# Patient Record
Sex: FEMALE | Race: WHITE | NOT HISPANIC OR LATINO | Employment: UNEMPLOYED | URBAN - METROPOLITAN AREA
[De-identification: names, ages, dates, MRNs, and addresses within clinical notes are randomized per-mention and may not be internally consistent; named-entity substitution may affect disease eponyms.]

---
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
## 2023-08-25 ENCOUNTER — OFFICE VISIT (OUTPATIENT)
Dept: FAMILY MEDICINE CLINIC | Facility: CLINIC | Age: 18
End: 2023-08-25
Payer: COMMERCIAL

## 2023-08-25 VITALS
RESPIRATION RATE: 16 BRPM | OXYGEN SATURATION: 99 % | SYSTOLIC BLOOD PRESSURE: 124 MMHG | HEART RATE: 81 BPM | HEIGHT: 64 IN | WEIGHT: 222.8 LBS | BODY MASS INDEX: 38.04 KG/M2 | DIASTOLIC BLOOD PRESSURE: 76 MMHG | TEMPERATURE: 97.7 F

## 2023-08-25 DIAGNOSIS — R79.89 ELEVATED CORTISOL LEVEL: ICD-10-CM

## 2023-08-25 DIAGNOSIS — R73.9 ELEVATED BLOOD SUGAR: ICD-10-CM

## 2023-08-25 DIAGNOSIS — Z00.00 ANNUAL PHYSICAL EXAM: Primary | ICD-10-CM

## 2023-08-25 DIAGNOSIS — E66.01 CLASS 2 SEVERE OBESITY DUE TO EXCESS CALORIES WITH SERIOUS COMORBIDITY AND BODY MASS INDEX (BMI) OF 38.0 TO 38.9 IN ADULT: ICD-10-CM

## 2023-08-25 DIAGNOSIS — L40.9 PSORIASIS: ICD-10-CM

## 2023-08-25 DIAGNOSIS — Z13.220 SCREENING FOR LIPID DISORDERS: ICD-10-CM

## 2023-08-25 DIAGNOSIS — Z11.59 NEED FOR HEPATITIS C SCREENING TEST: ICD-10-CM

## 2023-08-25 PROBLEM — E66.812 CLASS 2 SEVERE OBESITY DUE TO EXCESS CALORIES WITH SERIOUS COMORBIDITY AND BODY MASS INDEX (BMI) OF 38.0 TO 38.9 IN ADULT: Chronic | Status: ACTIVE | Noted: 2023-08-25

## 2023-08-25 PROBLEM — E66.812 CLASS 2 SEVERE OBESITY DUE TO EXCESS CALORIES WITH SERIOUS COMORBIDITY AND BODY MASS INDEX (BMI) OF 38.0 TO 38.9 IN ADULT: Status: ACTIVE | Noted: 2023-08-25

## 2023-08-25 RX ORDER — CETIRIZINE HYDROCHLORIDE 10 MG/1
10 TABLET ORAL DAILY PRN
COMMUNITY

## 2023-08-25 RX ORDER — DEXAMETHASONE 1 MG
1 TABLET ORAL ONCE
Qty: 1 TABLET | Refills: 0 | Status: SHIPPED | OUTPATIENT
Start: 2023-08-25 | End: 2023-08-25

## 2023-08-25 RX ORDER — TRIAMCINOLONE ACETONIDE 1 MG/G
1 CREAM TOPICAL 2 TIMES DAILY
Qty: 80 G | Refills: 0 | Status: SHIPPED | OUTPATIENT
Start: 2023-08-25

## 2023-08-25 NOTE — ASSESSMENT & PLAN NOTE
Patient's (Body mass index is 38.24 kg/mý.) indicates that they are morbidly/severely obese (BMI > 40 or > 35 with obesity - related health condition) with health conditions that include none . Weight is newly identified. BMI  is above average; BMI management plan is completed. We discussed low calorie, low carb based diet program, portion control, and increasing exercise.

## 2023-08-25 NOTE — PROGRESS NOTES
"Chief Complaint  Establish Care    Subjective        Arianna Leal presents to [facility]  History of Present Illness  She is here today to establish relations as a new patient. She is single and does not have a boyfriend, cat or dog. She lives at home. Her mother has a few health issues she is not aware. She denies tobacco, drug or EtOH use.     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    She is having a rash on her neck and shoulder.     The patient has no other complaints today and denies chest pain, shortness of breath, weakness, numbness, nausea, vomiting, diarrhea, dizziness or syncopal event.              Objective   Vital Signs:  /76   Pulse 81   Temp 97.7 øF (36.5 øC)   Resp 16   Ht 162.6 cm (64\")   Wt 101 kg (222 lb 12.8 oz)   SpO2 99%   BMI 38.24 kg/mý   Estimated body mass index is 38.24 kg/mý as calculated from the following:    Height as of this encounter: 162.6 cm (64\").    Weight as of this encounter: 101 kg (222 lb 12.8 oz).  Facility age limit for growth percentiles is 20 years.          Physical Exam  Vitals reviewed.   Constitutional:       Appearance: She is well-developed. She is morbidly obese.   HENT:      Head: Normocephalic and atraumatic.      Right Ear: External ear normal.      Left Ear: External ear normal.      Mouth/Throat:      Pharynx: No oropharyngeal exudate.   Eyes:      Conjunctiva/sclera: Conjunctivae normal.      Pupils: Pupils are equal, round, and reactive to light.   Neck:      Vascular: No carotid bruit.   Cardiovascular:      Rate and Rhythm: Normal rate and regular rhythm.      Heart sounds: No murmur heard.    No friction rub. No gallop.   Pulmonary:      Effort: Pulmonary effort is normal.      Breath sounds: Normal breath sounds. No wheezing or rhonchi.   Abdominal:      General: There is no distension.   Skin:     General: Skin is warm and dry.      Comments: Course raised erythematous area 10-4 cm on " left and posterior neck    Neurological:      Mental Status: She is alert and oriented to person, place, and time.      Cranial Nerves: No cranial nerve deficit.      Motor: No weakness.   Psychiatric:         Mood and Affect: Mood and affect normal.         Behavior: Behavior normal.         Thought Content: Thought content normal.         Judgment: Judgment normal.      Result Review :                         Assessment and Plan   Diagnoses and all orders for this visit:    1. Annual physical exam (Primary)  Assessment & Plan:  The patient was encouraged to always wear their seatbelt and never text and drive. She was encouraged to get 7 to 8 hours sleep at night. She was encouraged to exercise on a regular basis. She was encouraged to avoid drugs, EtOH and tobacco. Screening labs were reviewed at today's visit and manage according to findings.      Orders:  -     CBC & Differential; Future  -     Comprehensive metabolic panel; Future  -     TSH+Free T4; Future    2. Elevated cortisol level  -     DHEA  -     Cortisol - AM; Future  -     ACTH; Future  -     DHEA-Sulfate; Future  -     Testosterone (Free & Total), LC / MS; Future  -     Androstenedione; Future  -     17-Hydroxypregnenolone; Future          -     dexAMETHasone (DECADRON) 1 MG tablet; Take 1 tablet by mouth 1 (One) Time for 1 dose.  Dispense: 1 tablet; Refill: 0      3. Psoriasis  Assessment & Plan:  She was encouraged to try the steroid cream for three weeks and follow-up.     Orders:  -     triamcinolone (KENALOG) 0.1 % cream; Apply 1 application  topically to the appropriate area as directed 2 (Two) Times a Day.  Dispense: 80 g; Refill: 0    4. Need for hepatitis C screening test  -     Hepatitis C antibody; Future    5. Screening for lipid disorders  -     Lipid panel; Future    6. Elevated blood sugar  Comments:  She was given an order for an A1C to be managed according to findings.  Orders:  -     Hemoglobin A1c; Future    7. Class 2 severe  obesity due to excess calories with serious comorbidity and body mass index (BMI) of 38.0 to 38.9 in adult  Assessment & Plan:  Patient's (Body mass index is 38.24 kg/mý.) indicates that they are morbidly/severely obese (BMI > 40 or > 35 with obesity - related health condition) with health conditions that include none . Weight is newly identified. BMI  is above average; BMI management plan is completed. We discussed low calorie, low carb based diet program, portion control, and increasing exercise.          Follow Up   Return in about 2 weeks (around 9/8/2023).  Patient was given instructions and counseling regarding her condition or for health maintenance advice. Please see specific information pulled into the AVS if appropriate.

## 2023-08-25 NOTE — ASSESSMENT & PLAN NOTE
The patient was encouraged to always wear their seatbelt and never text and drive. She was encouraged to get 7 to 8 hours sleep at night. She was encouraged to exercise on a regular basis. She was encouraged to avoid drugs, EtOH and tobacco. Screening labs were reviewed at today's visit and manage according to findings.

## 2023-09-01 ENCOUNTER — TELEPHONE (OUTPATIENT)
Dept: FAMILY MEDICINE CLINIC | Facility: CLINIC | Age: 18
End: 2023-09-01
Payer: COMMERCIAL

## 2023-09-07 ENCOUNTER — LAB (OUTPATIENT)
Dept: LAB | Facility: HOSPITAL | Age: 18
End: 2023-09-07
Payer: COMMERCIAL

## 2023-09-07 DIAGNOSIS — R79.89 ELEVATED CORTISOL LEVEL: ICD-10-CM

## 2023-09-07 DIAGNOSIS — R73.9 ELEVATED BLOOD SUGAR: ICD-10-CM

## 2023-09-07 DIAGNOSIS — Z00.00 ANNUAL PHYSICAL EXAM: ICD-10-CM

## 2023-09-07 DIAGNOSIS — Z13.220 SCREENING FOR LIPID DISORDERS: ICD-10-CM

## 2023-09-07 DIAGNOSIS — Z11.59 NEED FOR HEPATITIS C SCREENING TEST: ICD-10-CM

## 2023-09-07 LAB
ALBUMIN SERPL-MCNC: 4.3 G/DL (ref 3.5–5.2)
ALBUMIN/GLOB SERPL: 1.7 G/DL
ALP SERPL-CCNC: 77 U/L (ref 39–117)
ALT SERPL W P-5'-P-CCNC: 21 U/L (ref 1–33)
ANION GAP SERPL CALCULATED.3IONS-SCNC: 9.2 MMOL/L (ref 5–15)
AST SERPL-CCNC: 12 U/L (ref 1–32)
BASOPHILS # BLD AUTO: 0.02 10*3/MM3 (ref 0–0.2)
BASOPHILS NFR BLD AUTO: 0.2 % (ref 0–1.5)
BILIRUB SERPL-MCNC: 0.2 MG/DL (ref 0–1.2)
BUN SERPL-MCNC: 9 MG/DL (ref 6–20)
BUN/CREAT SERPL: 12.9 (ref 7–25)
CALCIUM SPEC-SCNC: 9.6 MG/DL (ref 8.6–10.5)
CHLORIDE SERPL-SCNC: 107 MMOL/L (ref 98–107)
CHOLEST SERPL-MCNC: 177 MG/DL (ref 0–200)
CO2 SERPL-SCNC: 22.8 MMOL/L (ref 22–29)
CORTIS AM PEAK SERPL-MCNC: 16.7 MCG/DL (ref 6.02–18.4)
CREAT SERPL-MCNC: 0.7 MG/DL (ref 0.57–1)
DEPRECATED RDW RBC AUTO: 38.4 FL (ref 37–54)
EGFRCR SERPLBLD CKD-EPI 2021: 127.2 ML/MIN/1.73
EOSINOPHIL # BLD AUTO: 0.09 10*3/MM3 (ref 0–0.4)
EOSINOPHIL NFR BLD AUTO: 1 % (ref 0.3–6.2)
ERYTHROCYTE [DISTWIDTH] IN BLOOD BY AUTOMATED COUNT: 13.1 % (ref 12.3–15.4)
GLOBULIN UR ELPH-MCNC: 2.6 GM/DL
GLUCOSE SERPL-MCNC: 94 MG/DL (ref 65–99)
HBA1C MFR BLD: 5.1 % (ref 4.8–5.6)
HCT VFR BLD AUTO: 40.1 % (ref 34–46.6)
HCV AB SER DONR QL: NORMAL
HDLC SERPL-MCNC: 53 MG/DL (ref 40–60)
HGB BLD-MCNC: 13.1 G/DL (ref 12–15.9)
IMM GRANULOCYTES # BLD AUTO: 0.03 10*3/MM3 (ref 0–0.05)
IMM GRANULOCYTES NFR BLD AUTO: 0.3 % (ref 0–0.5)
LDLC SERPL CALC-MCNC: 109 MG/DL (ref 0–100)
LDLC/HDLC SERPL: 2.05 {RATIO}
LYMPHOCYTES # BLD AUTO: 1.9 10*3/MM3 (ref 0.7–3.1)
LYMPHOCYTES NFR BLD AUTO: 22 % (ref 19.6–45.3)
MCH RBC QN AUTO: 26.6 PG (ref 26.6–33)
MCHC RBC AUTO-ENTMCNC: 32.7 G/DL (ref 31.5–35.7)
MCV RBC AUTO: 81.3 FL (ref 79–97)
MONOCYTES # BLD AUTO: 0.84 10*3/MM3 (ref 0.1–0.9)
MONOCYTES NFR BLD AUTO: 9.7 % (ref 5–12)
NEUTROPHILS NFR BLD AUTO: 5.75 10*3/MM3 (ref 1.7–7)
NEUTROPHILS NFR BLD AUTO: 66.8 % (ref 42.7–76)
NRBC BLD AUTO-RTO: 0 /100 WBC (ref 0–0.2)
PLATELET # BLD AUTO: 344 10*3/MM3 (ref 140–450)
PMV BLD AUTO: 9.7 FL (ref 6–12)
POTASSIUM SERPL-SCNC: 4.2 MMOL/L (ref 3.5–5.2)
PROT SERPL-MCNC: 6.9 G/DL (ref 6–8.5)
RBC # BLD AUTO: 4.93 10*6/MM3 (ref 3.77–5.28)
SODIUM SERPL-SCNC: 139 MMOL/L (ref 136–145)
T4 FREE SERPL-MCNC: 0.94 NG/DL (ref 0.93–1.7)
TRIGL SERPL-MCNC: 78 MG/DL (ref 0–150)
TSH SERPL DL<=0.05 MIU/L-ACNC: 2.75 UIU/ML (ref 0.27–4.2)
VLDLC SERPL-MCNC: 15 MG/DL (ref 5–40)
WBC NRBC COR # BLD: 8.63 10*3/MM3 (ref 3.4–10.8)

## 2023-09-07 PROCEDURE — 82533 TOTAL CORTISOL: CPT

## 2023-09-07 PROCEDURE — 82626 DEHYDROEPIANDROSTERONE: CPT | Performed by: FAMILY MEDICINE

## 2023-09-07 PROCEDURE — 84439 ASSAY OF FREE THYROXINE: CPT

## 2023-09-07 PROCEDURE — 84143 ASSAY OF 17-HYDROXYPREGNENO: CPT

## 2023-09-07 PROCEDURE — 84403 ASSAY OF TOTAL TESTOSTERONE: CPT

## 2023-09-07 PROCEDURE — 80053 COMPREHEN METABOLIC PANEL: CPT

## 2023-09-07 PROCEDURE — 82024 ASSAY OF ACTH: CPT

## 2023-09-07 PROCEDURE — 84402 ASSAY OF FREE TESTOSTERONE: CPT

## 2023-09-07 PROCEDURE — 36415 COLL VENOUS BLD VENIPUNCTURE: CPT

## 2023-09-07 PROCEDURE — 82627 DEHYDROEPIANDROSTERONE: CPT

## 2023-09-07 PROCEDURE — 85025 COMPLETE CBC W/AUTO DIFF WBC: CPT

## 2023-09-07 PROCEDURE — 84443 ASSAY THYROID STIM HORMONE: CPT

## 2023-09-07 PROCEDURE — 86803 HEPATITIS C AB TEST: CPT

## 2023-09-07 PROCEDURE — 80061 LIPID PANEL: CPT

## 2023-09-07 PROCEDURE — 83036 HEMOGLOBIN GLYCOSYLATED A1C: CPT

## 2023-09-07 PROCEDURE — 82157 ASSAY OF ANDROSTENEDIONE: CPT

## 2023-09-08 ENCOUNTER — OFFICE VISIT (OUTPATIENT)
Dept: FAMILY MEDICINE CLINIC | Facility: CLINIC | Age: 18
End: 2023-09-08
Payer: COMMERCIAL

## 2023-09-08 VITALS
HEART RATE: 65 BPM | WEIGHT: 223.8 LBS | HEIGHT: 64 IN | RESPIRATION RATE: 18 BRPM | OXYGEN SATURATION: 100 % | TEMPERATURE: 98.2 F | DIASTOLIC BLOOD PRESSURE: 64 MMHG | BODY MASS INDEX: 38.21 KG/M2 | SYSTOLIC BLOOD PRESSURE: 125 MMHG

## 2023-09-08 DIAGNOSIS — R68.89 ABNORMAL ENDOCRINE LABORATORY TEST FINDING: ICD-10-CM

## 2023-09-08 DIAGNOSIS — L40.9 PSORIASIS: Primary | Chronic | ICD-10-CM

## 2023-09-08 DIAGNOSIS — E66.01 CLASS 2 SEVERE OBESITY DUE TO EXCESS CALORIES WITH SERIOUS COMORBIDITY AND BODY MASS INDEX (BMI) OF 38.0 TO 38.9 IN ADULT: Chronic | ICD-10-CM

## 2023-09-08 LAB
ACTH PLAS-MCNC: 23.4 PG/ML (ref 7.2–63.3)
DHEA-S SERPL-MCNC: 36.7 UG/DL (ref 110–431.7)

## 2023-09-08 NOTE — PROGRESS NOTES
"Chief Complaint  Psoriasis (2 week follow up after steroid cream)    Subjective        Arianna Leal presents to [facility]  History of Present Illness  She is here today for management of her chronic medical conditions. She is single and does not have a boyfriend, cat or dog. She lives at home. Her mother has a few health issues she is not aware. She denies tobacco, drug or EtOH use.      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 She is going to graduate in Sept of this year.      She had a rash on her neck and shoulders that had been present for months. She was given triamcinolone cream at her last visit and it resolved.      The patient has no other complaints today and denies chest pain, shortness of breath, weakness, numbness, nausea, vomiting, diarrhea, dizziness or syncopal event.      Objective   Vital Signs:  /64 (BP Location: Left arm, Patient Position: Sitting, Cuff Size: Adult)   Pulse 65   Temp 98.2 °F (36.8 °C) (Tympanic)   Resp 18   Ht 162.6 cm (64.02\")   Wt 102 kg (223 lb 12.8 oz)   SpO2 100%   BMI 38.40 kg/m²   Estimated body mass index is 38.4 kg/m² as calculated from the following:    Height as of this encounter: 162.6 cm (64.02\").    Weight as of this encounter: 102 kg (223 lb 12.8 oz).  Facility age limit for growth percentiles is 20 years.            Physical Exam  Vitals reviewed.   Constitutional:       Appearance: She is well-developed. She is morbidly obese.   HENT:      Head: Normocephalic and atraumatic.      Right Ear: External ear normal.      Left Ear: External ear normal.      Mouth/Throat:      Pharynx: No oropharyngeal exudate.   Eyes:      Conjunctiva/sclera: Conjunctivae normal.      Pupils: Pupils are equal, round, and reactive to light.   Neck:      Vascular: No carotid bruit.   Cardiovascular:      Rate and Rhythm: Normal rate and regular rhythm.      Heart sounds: No murmur heard.    No friction rub. No gallop. "   Pulmonary:      Effort: Pulmonary effort is normal.      Breath sounds: Normal breath sounds. No wheezing or rhonchi.   Abdominal:      General: There is no distension.   Skin:     General: Skin is warm and dry.   Neurological:      Mental Status: She is alert and oriented to person, place, and time.      Cranial Nerves: No cranial nerve deficit.      Motor: No weakness.   Psychiatric:         Mood and Affect: Mood and affect normal.         Behavior: Behavior normal.         Thought Content: Thought content normal.         Judgment: Judgment normal.      Result Review :    CMP          9/7/2023    08:15   CMP   Glucose 94    BUN 9    Creatinine 0.70    EGFR 127.2    Sodium 139    Potassium 4.2    Chloride 107    Calcium 9.6    Total Protein 6.9    Albumin 4.3    Globulin 2.6    Total Bilirubin 0.2    Alkaline Phosphatase 77    AST (SGOT) 12    ALT (SGPT) 21    Albumin/Globulin Ratio 1.7    BUN/Creatinine Ratio 12.9    Anion Gap 9.2      CBC          9/7/2023    08:15   CBC   WBC 8.63    RBC 4.93    Hemoglobin 13.1    Hematocrit 40.1    MCV 81.3    MCH 26.6    MCHC 32.7    RDW 13.1    Platelets 344      Lipid Panel          9/7/2023    08:15   Lipid Panel   Total Cholesterol 177    Triglycerides 78    HDL Cholesterol 53    VLDL Cholesterol 15    LDL Cholesterol  109    LDL/HDL Ratio 2.05      TSH          9/7/2023    08:15   TSH   TSH 2.750                   Assessment and Plan   Diagnoses and all orders for this visit:    1. Psoriasis (Primary)  Assessment & Plan:  Improving with treatment, will continue triamcinolone cream as needed.       2. Class 2 severe obesity due to excess calories with serious comorbidity and body mass index (BMI) of 38.0 to 38.9 in adult  Assessment & Plan:  Patient's (Body mass index is 38.4 kg/m².) indicates that they are morbidly/severely obese (BMI > 40 or > 35 with obesity - related health condition) with health conditions that include none . Weight is worsening. BMI  is above  average; BMI management plan is completed. We discussed low calorie, low carb based diet program, portion control, and increasing exercise.       3. Abnormal endocrine laboratory test finding  Assessment & Plan:  DHEA came back low. Dexamethasone suppression test negative. Still waiting for ACTH and other levels.                Follow Up   Return in about 6 months (around 3/8/2024).  Patient was given instructions and counseling regarding her condition or for health maintenance advice. Please see specific information pulled into the AVS if appropriate.

## 2023-09-09 PROBLEM — R68.89 ABNORMAL ENDOCRINE LABORATORY TEST FINDING: Status: ACTIVE | Noted: 2023-09-09

## 2023-09-09 PROBLEM — R68.89 ABNORMAL ENDOCRINE LABORATORY TEST FINDING: Chronic | Status: ACTIVE | Noted: 2023-09-09

## 2023-09-09 PROBLEM — Z00.00 ANNUAL PHYSICAL EXAM: Status: RESOLVED | Noted: 2023-08-25 | Resolved: 2023-09-09

## 2023-09-09 NOTE — ASSESSMENT & PLAN NOTE
DHEA came back low. Dexamethasone suppression test negative. Still waiting for ACTH and other levels.

## 2023-09-09 NOTE — ASSESSMENT & PLAN NOTE
Patient's (Body mass index is 38.4 kg/m².) indicates that they are morbidly/severely obese (BMI > 40 or > 35 with obesity - related health condition) with health conditions that include none . Weight is worsening. BMI  is above average; BMI management plan is completed. We discussed low calorie, low carb based diet program, portion control, and increasing exercise.

## 2023-09-11 LAB — ANDROST SERPL-MCNC: 201 NG/DL (ref 41–262)

## 2023-09-13 LAB — DHEA SERPL-MCNC: 154 NG/DL (ref 31–701)

## 2023-09-15 LAB — 17OH-PREG SERPL-MCNC: 104 NG/DL

## 2023-09-16 LAB
TESTOST FREE SERPL-MCNC: 1 PG/ML (ref 0–4.2)
TESTOST SERPL-MCNC: 33.7 NG/DL (ref 10–55)

## 2023-11-07 ENCOUNTER — OFFICE VISIT (OUTPATIENT)
Dept: FAMILY MEDICINE CLINIC | Facility: CLINIC | Age: 18
End: 2023-11-07
Payer: COMMERCIAL

## 2023-11-07 VITALS
DIASTOLIC BLOOD PRESSURE: 84 MMHG | TEMPERATURE: 98.2 F | HEART RATE: 76 BPM | WEIGHT: 228.2 LBS | RESPIRATION RATE: 18 BRPM | SYSTOLIC BLOOD PRESSURE: 139 MMHG | OXYGEN SATURATION: 100 % | BODY MASS INDEX: 38.96 KG/M2 | HEIGHT: 64 IN

## 2023-11-07 DIAGNOSIS — Z71.3 ENCOUNTER FOR WEIGHT LOSS COUNSELING: ICD-10-CM

## 2023-11-07 DIAGNOSIS — F50.81 BINGE EATING DISORDER: Primary | ICD-10-CM

## 2023-11-07 DIAGNOSIS — Z51.81 MEDICATION MONITORING ENCOUNTER: ICD-10-CM

## 2023-11-07 DIAGNOSIS — E66.09 CLASS 2 OBESITY DUE TO EXCESS CALORIES WITHOUT SERIOUS COMORBIDITY WITH BODY MASS INDEX (BMI) OF 39.0 TO 39.9 IN ADULT: ICD-10-CM

## 2023-11-07 DIAGNOSIS — E03.9 ACQUIRED HYPOTHYROIDISM: ICD-10-CM

## 2023-11-07 PROBLEM — E66.812 CLASS 2 SEVERE OBESITY DUE TO EXCESS CALORIES WITH SERIOUS COMORBIDITY AND BODY MASS INDEX (BMI) OF 38.0 TO 38.9 IN ADULT: Chronic | Status: RESOLVED | Noted: 2023-08-25 | Resolved: 2023-11-07

## 2023-11-07 PROBLEM — F50.819 BINGE EATING DISORDER: Status: ACTIVE | Noted: 2023-11-07

## 2023-11-07 PROBLEM — E66.01 CLASS 2 SEVERE OBESITY DUE TO EXCESS CALORIES WITH SERIOUS COMORBIDITY AND BODY MASS INDEX (BMI) OF 38.0 TO 38.9 IN ADULT: Chronic | Status: RESOLVED | Noted: 2023-08-25 | Resolved: 2023-11-07

## 2023-11-07 PROBLEM — E66.812 CLASS 2 OBESITY DUE TO EXCESS CALORIES WITHOUT SERIOUS COMORBIDITY WITH BODY MASS INDEX (BMI) OF 39.0 TO 39.9 IN ADULT: Status: ACTIVE | Noted: 2023-11-07

## 2023-11-07 LAB
AMPHET+METHAMPHET UR QL: NEGATIVE
AMPHETAMINE INTERNAL CONTROL: NORMAL
AMPHETAMINES UR QL: NEGATIVE
BARBITURATE INTERNAL CONTROL: NORMAL
BARBITURATES UR QL SCN: NEGATIVE
BENZODIAZ UR QL SCN: NEGATIVE
BENZODIAZEPINE INTERNAL CONTROL: NORMAL
BUPRENORPHINE INTERNAL CONTROL: NORMAL
BUPRENORPHINE SERPL-MCNC: NEGATIVE NG/ML
CANNABINOIDS SERPL QL: NEGATIVE
COCAINE INTERNAL CONTROL: NORMAL
COCAINE UR QL: NEGATIVE
EXPIRATION DATE: NORMAL
Lab: NORMAL
MDMA (ECSTASY) INTERNAL CONTROL: NORMAL
MDMA UR QL SCN: NEGATIVE
METHADONE INTERNAL CONTROL: NORMAL
METHADONE UR QL SCN: NEGATIVE
METHAMPHETAMINE INTERNAL CONTROL: NORMAL
MORPHINE INTERNAL CONTROL: NORMAL
MORPHINE UR QL SCN: NEGATIVE
OXYCODONE INTERNAL CONTROL: NORMAL
OXYCODONE UR QL SCN: NEGATIVE
PCP UR QL SCN: NEGATIVE
PHENCYCLIDINE INTERNAL CONTROL: NORMAL
THC INTERNAL CONTROL: NORMAL

## 2023-11-07 RX ORDER — LISDEXAMFETAMINE DIMESYLATE CAPSULES 30 MG/1
30 CAPSULE ORAL EVERY MORNING
Qty: 30 CAPSULE | Refills: 0 | Status: SHIPPED | OUTPATIENT
Start: 2023-11-07 | End: 2023-11-08 | Stop reason: SDUPTHER

## 2023-11-07 RX ORDER — SEMAGLUTIDE 0.25 MG/.5ML
0.5 INJECTION, SOLUTION SUBCUTANEOUS
Qty: 2 ML | Refills: 0 | Status: SHIPPED | OUTPATIENT
Start: 2023-11-07 | End: 2023-11-08 | Stop reason: SDUPTHER

## 2023-11-07 RX ORDER — LEVOTHYROXINE SODIUM 0.03 MG/1
25 TABLET ORAL
Qty: 30 TABLET | Refills: 5 | Status: SHIPPED | OUTPATIENT
Start: 2023-11-07

## 2023-11-07 NOTE — PROGRESS NOTES
"Chief Complaint  Weight Loss (Discuss /)    Subjective        Arianna Leal presents to [facility]  History of Present Illness  She is here today for management of her chronic medical conditions and for weight loss. She is single and does not have a boyfriend, cat or dog. She lives at home. Her mother has a few health issues she is not aware. She denies tobacco, drug or EtOH use.      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      The patient has no other complaints today and denies chest pain, shortness of breath, weakness, numbness, nausea, vomiting, diarrhea, dizziness or syncopal event.        Objective   Vital Signs:  /84 (BP Location: Left arm, Patient Position: Sitting, Cuff Size: Adult)   Pulse 76   Temp 98.2 °F (36.8 °C) (Tympanic)   Resp 18   Ht 162.6 cm (64.02\")   Wt 104 kg (228 lb 3.2 oz)   SpO2 100%   BMI 39.15 kg/m²   Estimated body mass index is 39.15 kg/m² as calculated from the following:    Height as of this encounter: 162.6 cm (64.02\").    Weight as of this encounter: 104 kg (228 lb 3.2 oz).  Facility age limit for growth %ronald is 20 years.            Physical Exam  Vitals reviewed.   Constitutional:       Appearance: She is well-developed. She is morbidly obese.   HENT:      Head: Normocephalic and atraumatic.      Right Ear: External ear normal.      Left Ear: External ear normal.      Mouth/Throat:      Pharynx: No oropharyngeal exudate.   Eyes:      Conjunctiva/sclera: Conjunctivae normal.      Pupils: Pupils are equal, round, and reactive to light.   Neck:      Vascular: No carotid bruit.   Cardiovascular:      Rate and Rhythm: Normal rate and regular rhythm.      Heart sounds: No murmur heard.     No friction rub. No gallop.   Pulmonary:      Effort: Pulmonary effort is normal.      Breath sounds: Normal breath sounds. No wheezing or rhonchi.   Abdominal:      General: There is no distension.   Skin:     General: " Skin is warm and dry.   Neurological:      Mental Status: She is alert and oriented to person, place, and time.      Cranial Nerves: No cranial nerve deficit.      Motor: No weakness.   Psychiatric:         Mood and Affect: Mood and affect normal.         Behavior: Behavior normal.         Thought Content: Thought content normal.         Judgment: Judgment normal.        Result Review :    CMP          9/7/2023    08:15   CMP   Glucose 94    BUN 9    Creatinine 0.70    EGFR 127.2    Sodium 139    Potassium 4.2    Chloride 107    Calcium 9.6    Total Protein 6.9    Albumin 4.3    Globulin 2.6    Total Bilirubin 0.2    Alkaline Phosphatase 77    AST (SGOT) 12    ALT (SGPT) 21    Albumin/Globulin Ratio 1.7    BUN/Creatinine Ratio 12.9    Anion Gap 9.2      CBC          9/7/2023    08:15   CBC   WBC 8.63    RBC 4.93    Hemoglobin 13.1    Hematocrit 40.1    MCV 81.3    MCH 26.6    MCHC 32.7    RDW 13.1    Platelets 344      Lipid Panel          9/7/2023    08:15   Lipid Panel   Total Cholesterol 177    Triglycerides 78    HDL Cholesterol 53    VLDL Cholesterol 15    LDL Cholesterol  109    LDL/HDL Ratio 2.05      TSH          9/7/2023    08:15   TSH   TSH 2.750                   Assessment and Plan   Diagnoses and all orders for this visit:    1. Binge eating disorder (Primary)  Assessment & Plan:  Psychological condition is worsening.  Medication changes per orders.  Psychological condition  will be reassessed at the next regular appointment.    Orders:  -     Semaglutide-Weight Management (Wegovy) 0.25 MG/0.5ML solution auto-injector; Inject 0.5 mL under the skin into the appropriate area as directed Every 7 (Seven) Days.  Dispense: 2 mL; Refill: 0  -     lisdexamfetamine (Vyvanse) 30 MG capsule; Take 1 capsule by mouth Every Morning  Dispense: 30 capsule; Refill: 0    2. Acquired hypothyroidism  -     levothyroxine (SYNTHROID, LEVOTHROID) 25 MCG tablet; Take 1 tablet by mouth Every Morning.  Dispense: 30 tablet;  Refill: 5  -     TSH+Free T4; Future    3. Medication monitoring encounter  -     POC 12 Panel Urine Drug Screen    4. Class 2 obesity due to excess calories without serious comorbidity with body mass index (BMI) of 39.0 to 39.9 in adult  Assessment & Plan:  Patient's (Body mass index is 39.15 kg/m².) indicates that they are morbidly/severely obese (BMI > 40 or > 35 with obesity - related health condition) with health conditions that include none . Weight is worsening. BMI  is above average; BMI management plan is completed. We discussed low calorie, low carb based diet program, portion control, and increasing exercise.       5. Encounter for weight loss counseling             Follow Up   Return in about 1 month (around 12/7/2023).  Patient was given instructions and counseling regarding her condition or for health maintenance advice. Please see specific information pulled into the AVS if appropriate.

## 2023-11-07 NOTE — ASSESSMENT & PLAN NOTE
Patient's (Body mass index is 39.15 kg/m².) indicates that they are morbidly/severely obese (BMI > 40 or > 35 with obesity - related health condition) with health conditions that include none . Weight is worsening. BMI  is above average; BMI management plan is completed. We discussed low calorie, low carb based diet program, portion control, and increasing exercise.

## 2023-11-08 ENCOUNTER — TELEPHONE (OUTPATIENT)
Dept: FAMILY MEDICINE CLINIC | Facility: CLINIC | Age: 18
End: 2023-11-08
Payer: COMMERCIAL

## 2023-11-08 DIAGNOSIS — F50.81 BINGE EATING DISORDER: ICD-10-CM

## 2023-11-08 RX ORDER — SEMAGLUTIDE 0.25 MG/.5ML
0.5 INJECTION, SOLUTION SUBCUTANEOUS
Qty: 2 ML | Refills: 0 | Status: SHIPPED | OUTPATIENT
Start: 2023-11-08

## 2023-11-08 RX ORDER — LISDEXAMFETAMINE DIMESYLATE CAPSULES 30 MG/1
30 CAPSULE ORAL EVERY MORNING
Qty: 30 CAPSULE | Refills: 0 | Status: SHIPPED | OUTPATIENT
Start: 2023-11-08

## 2023-11-08 NOTE — TELEPHONE ENCOUNTER
Caller: Arianna Leal    Relationship: Self    Best call back number: [phone]     Who are you requesting to speak with (clinical staff, provider,  specific staff member): MEDICAL STAFF    What was the call regarding: THE WEGOVY MEDICATION REQUIRES A PRIOR AUTHORIZATION.

## 2023-11-08 NOTE — TELEPHONE ENCOUNTER
Patient was asking about skipping dosage due to pharmacy being out of the starting dose. I let patient know that is not recommended and she would have to start with the starting dosage. [facility] is estimating their stock to be in around 11/17, I let patient know to call around that time and see if it is available. If not and she wants to try something different, I let her know to give us a call back.    [facility]

## 2023-11-08 NOTE — TELEPHONE ENCOUNTER
[facility] does not have this in stock, [facility] does.   Patient would like this sent to them.  I will call [facility] and cancel with them.

## 2023-11-08 NOTE — TELEPHONE ENCOUNTER
Name: Arianna Leal    Relationship: Self    Best Callback Number: [phone]    HUB PROVIDED THE RELAY MESSAGE FROM THE OFFICE   PATIENT HAS FURTHER QUESTIONS AND WOULD LIKE A CALL BACK AT THE FOLLOWING PHONE NUMBER [phone]    ADDITIONAL INFORMATION: HUB TO RELAY WAS READ TO PATIENT VERBATIM. PATIENT WOULD LIKE TO SPEAK TO SOMEONE ABOUT THE WEGOVY. HER AUNT GETS THIS MEDICATION AT [facility]. SHE HAD A QUESTION ABOUT THE DOSAGE. PLEASE CALL PATIENT BACK AND ADVISE.

## 2023-11-08 NOTE — TELEPHONE ENCOUNTER
Caller: Arianna Leal    Relationship: Self    Best call back number: [phone]     Who are you requesting to speak with (clinical staff, provider,  specific staff member): MEDICAL STAFF    What was the call regarding: PATIENT WAS SENT IN A VYVANSE SCRIPT TO [facility]. THE DO NOT HAVE THE MEDICATION IN STOCK. SHE CALLED [facility] AND THEY DO HAVE THE MEDICATION IN STOCK. SHE IS REQUESTING THAT THE SCRIPT IS SWITCHED OVER TO THEM. PATIENT WOULD LIKE A CALL WHEN THE SCRIPT IS SENT TO [facility].    [address] [facility] [place] [phone] [facility] [phone] FX

## 2023-11-08 NOTE — TELEPHONE ENCOUNTER
Medication was approved.  Tried to reach patient, no answer and no VM picked up.      Ok for hub to relay: Medication was approved.

## 2023-11-08 NOTE — TELEPHONE ENCOUNTER
Caller: Arianna Leal    Relationship: Self    Best call back number: [phone]       Who are you requesting to speak with (clinical staff, provider,  specific staff member): CLINICAL    What was the call regarding: PATIENT STATES TAHT SHE NEEDS A PRIOR AUTHORIZATION COMPLETED FOR HER lisdexamfetamine (Vyvanse) 30 MG capsule

## 2023-11-08 NOTE — TELEPHONE ENCOUNTER
Prior authorization was denied, wegovy is excluded from her plan.  Tried to reach patient, no answer, no VM, line just kept ringing.     Ok for hub to relay: Prior authorization was denied, wegovy is excluded from her insurance plan.

## 2023-11-08 NOTE — TELEPHONE ENCOUNTER
Name: Arianna Leal    Relationship: Self    Best Callback Number: [phone]    HUB PROVIDED THE RELAY MESSAGE FROM THE OFFICE   PATIENT VOICED UNDERSTANDING AND HAS NO FURTHER QUESTIONS AT THIS TIME    ADDITIONAL INFORMATION: HUB TO RELAY WAS READ TO PATIENT ABOUT THE WEGOVY BEING DENIED AND PA BEING DENIED BY HER INSURANCE. PATIENT WANTED TO SPEAK TO MA ABOUT HER VYVANSE BEING SENT TO [facility] SINCE TE WAS STILL OPEN NOT SIGNED OFF ON YET. WARM TRANSFERRED THE CALL TO DISCUSS VYVANSE.

## 2023-11-28 ENCOUNTER — TELEPHONE (OUTPATIENT)
Dept: FAMILY MEDICINE CLINIC | Facility: CLINIC | Age: 18
End: 2023-11-28
Payer: COMMERCIAL

## 2023-11-28 NOTE — TELEPHONE ENCOUNTER
Caller: Arianna Leal    Relationship to patient: Self    Best call back number: [phone]    Patient is needing: PATIENT CALLED IN AND IS COVID POSITIVE AS OF THIS MORNING WITH AND IS REQUESTING PRESCRIPTION FOR PAXLOVID AND AN EXCUSE FOR WORK IF POSSIBLE.         [facility] - [place], [place] - [address] [facility] - [phone]  - [phone]  [phone]

## 2023-11-28 NOTE — LETTER
November 29, 2023     Patient: Arianna Leal   YOB: 2005   Date of Visit: 11/28/2023       To Whom It May Concern:    It is my medical opinion that Arianna Leal may return to work on 12/04/2023 due to illness. If you have any questions please give our office a call at [phone].           Sincerely,        Caryl Bass DO    CC:   No Recipients

## 2023-12-15 ENCOUNTER — OFFICE VISIT (OUTPATIENT)
Dept: FAMILY MEDICINE CLINIC | Facility: CLINIC | Age: 18
End: 2023-12-15
Payer: COMMERCIAL

## 2023-12-15 VITALS
BODY MASS INDEX: 38.14 KG/M2 | HEIGHT: 64 IN | SYSTOLIC BLOOD PRESSURE: 133 MMHG | OXYGEN SATURATION: 99 % | RESPIRATION RATE: 18 BRPM | DIASTOLIC BLOOD PRESSURE: 76 MMHG | WEIGHT: 223.4 LBS | HEART RATE: 91 BPM | TEMPERATURE: 98.2 F

## 2023-12-15 DIAGNOSIS — F50.81 BINGE EATING DISORDER: Primary | ICD-10-CM

## 2023-12-15 DIAGNOSIS — L40.9 PSORIASIS: ICD-10-CM

## 2023-12-15 DIAGNOSIS — E66.09 CLASS 2 OBESITY DUE TO EXCESS CALORIES WITHOUT SERIOUS COMORBIDITY WITH BODY MASS INDEX (BMI) OF 38.0 TO 38.9 IN ADULT: Chronic | ICD-10-CM

## 2023-12-15 PROBLEM — E66.812 CLASS 2 OBESITY DUE TO EXCESS CALORIES WITHOUT SERIOUS COMORBIDITY WITH BODY MASS INDEX (BMI) OF 38.0 TO 38.9 IN ADULT: Chronic | Status: ACTIVE | Noted: 2023-11-07

## 2023-12-15 RX ORDER — LISDEXAMFETAMINE DIMESYLATE CAPSULES 30 MG/1
30 CAPSULE ORAL EVERY MORNING
Qty: 30 CAPSULE | Refills: 0 | Status: SHIPPED | OUTPATIENT
Start: 2023-12-15

## 2023-12-15 RX ORDER — TRIAMCINOLONE ACETONIDE 1 MG/G
1 CREAM TOPICAL 2 TIMES DAILY
Qty: 80 G | Refills: 1 | Status: SHIPPED | OUTPATIENT
Start: 2023-12-15

## 2023-12-15 NOTE — PROGRESS NOTES
"Chief Complaint  Weight Check    Subjective        Arianna Leal presents to [facility]  History of Present Illness  She is here today for management of her chronic medical conditions and for weight loss. She is single and does not have a boyfriend, cat or dog. She lives at home. Her mother has a few health issues she is not aware. She denies tobacco, drug or EtOH use.      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    She is lost 4 pounds since her last visit on Vyvanse 30 mg daily.     The patient has no other complaints today and denies chest pain, shortness of breath, weakness, numbness, nausea, vomiting, diarrhea, dizziness or syncopal event.           Objective   Vital Signs:  /76 (BP Location: Left arm, Patient Position: Sitting, Cuff Size: Adult)   Pulse 91   Temp 98.2 °F (36.8 °C) (Tympanic)   Resp 18   Ht 162.6 cm (64.02\")   Wt 101 kg (223 lb 6.4 oz)   SpO2 99%   BMI 38.33 kg/m²   Estimated body mass index is 38.33 kg/m² as calculated from the following:    Height as of this encounter: 162.6 cm (64.02\").    Weight as of this encounter: 101 kg (223 lb 6.4 oz).  Facility age limit for growth %ronald is 18 years.            Physical Exam  Vitals reviewed.   Constitutional:       Appearance: She is well-developed. She is morbidly obese.   HENT:      Head: Normocephalic and atraumatic.      Right Ear: External ear normal.      Left Ear: External ear normal.      Mouth/Throat:      Pharynx: No oropharyngeal exudate.   Eyes:      Conjunctiva/sclera: Conjunctivae normal.      Pupils: Pupils are equal, round, and reactive to light.   Neck:      Vascular: No carotid bruit.   Cardiovascular:      Rate and Rhythm: Normal rate and regular rhythm.      Heart sounds: No murmur heard.     No friction rub. No gallop.   Pulmonary:      Effort: Pulmonary effort is normal.      Breath sounds: Normal breath sounds. No wheezing or rhonchi.   Abdominal: "      General: There is no distension.   Skin:     General: Skin is warm and dry.   Neurological:      Mental Status: She is alert and oriented to person, place, and time.      Cranial Nerves: No cranial nerve deficit.      Motor: No weakness.   Psychiatric:         Mood and Affect: Mood and affect normal.         Behavior: Behavior normal.         Thought Content: Thought content normal.         Judgment: Judgment normal.        Result Review :    CMP          9/7/2023    08:15   CMP   Glucose 94    BUN 9    Creatinine 0.70    EGFR 127.2    Sodium 139    Potassium 4.2    Chloride 107    Calcium 9.6    Total Protein 6.9    Albumin 4.3    Globulin 2.6    Total Bilirubin 0.2    Alkaline Phosphatase 77    AST (SGOT) 12    ALT (SGPT) 21    Albumin/Globulin Ratio 1.7    BUN/Creatinine Ratio 12.9    Anion Gap 9.2      CBC          9/7/2023    08:15   CBC   WBC 8.63    RBC 4.93    Hemoglobin 13.1    Hematocrit 40.1    MCV 81.3    MCH 26.6    MCHC 32.7    RDW 13.1    Platelets 344      Lipid Panel          9/7/2023    08:15   Lipid Panel   Total Cholesterol 177    Triglycerides 78    HDL Cholesterol 53    VLDL Cholesterol 15    LDL Cholesterol  109    LDL/HDL Ratio 2.05      TSH          9/7/2023    08:15   TSH   TSH 2.750                   Assessment and Plan   Diagnoses and all orders for this visit:    1. Binge eating disorder (Primary)  Assessment & Plan:  The patient will be continued on 30 mg of Vyvanse for the next month.  If at that time her weight loss slows we will increase it to 40 mg daily.  Follow back up 3 months.    Orders:  -     lisdexamfetamine (Vyvanse) 30 MG capsule; Take 1 capsule by mouth Every Morning  Dispense: 30 capsule; Refill: 0    2. Class 2 obesity due to excess calories without serious comorbidity with body mass index (BMI) of 38.0 to 38.9 in adult  Assessment & Plan:  Patient's (Body mass index is 38.33 kg/m².) indicates that they are morbidly/severely obese (BMI > 40 or > 35 with obesity -  related health condition) with health conditions that include none . Weight is improving with treatment. BMI  is above average; BMI management plan is completed. We discussed low calorie, low carb based diet program, portion control, and increasing exercise.       3. Psoriasis  Assessment & Plan:  The patient's symptoms today near her recent psoriasis flare.  She ran out of her medications or another prescription triamcinolone was sent in today to be used as directed.    Orders:  -     triamcinolone (KENALOG) 0.1 % cream; Apply 1 application  topically to the appropriate area as directed 2 (Two) Times a Day.  Dispense: 80 g; Refill: 1             Follow Up   Return in about 3 months (around 3/15/2024).  Patient was given instructions and counseling regarding her condition or for health maintenance advice. Please see specific information pulled into the AVS if appropriate.

## 2023-12-17 PROBLEM — Z71.3 ENCOUNTER FOR WEIGHT LOSS COUNSELING: Status: RESOLVED | Noted: 2023-08-25 | Resolved: 2023-12-17

## 2023-12-17 NOTE — ASSESSMENT & PLAN NOTE
The patient will be continued on 30 mg of Vyvanse for the next month.  If at that time her weight loss slows we will increase it to 40 mg daily.  Follow back up 3 months.

## 2023-12-17 NOTE — ASSESSMENT & PLAN NOTE
The patient's symptoms today near her recent psoriasis flare.  She ran out of her medications or another prescription triamcinolone was sent in today to be used as directed.

## 2023-12-17 NOTE — ASSESSMENT & PLAN NOTE
Patient's (Body mass index is 38.33 kg/m².) indicates that they are morbidly/severely obese (BMI > 40 or > 35 with obesity - related health condition) with health conditions that include none . Weight is improving with treatment. BMI  is above average; BMI management plan is completed. We discussed low calorie, low carb based diet program, portion control, and increasing exercise.

## 2024-01-29 ENCOUNTER — TELEPHONE (OUTPATIENT)
Dept: FAMILY MEDICINE CLINIC | Facility: CLINIC | Age: 19
End: 2024-01-29
Payer: COMMERCIAL

## 2024-01-29 NOTE — TELEPHONE ENCOUNTER
Caller: Arianna Leal    Relationship: Self    Best call back number: [phone]     What is the best time to reach you: ANYTIME     Who are you requesting to speak with (clinical staff, provider,  specific staff member): CLINICAL     What was the call regarding: PATIENT IS CALLING WITH QUESTIONS FOLLOWING RECENT URGENT CARE VISIT AND TESTING NEGATIVE FOR FLU BUT WAS CONCERN WITH BEGIN AROUND FAMILY MEMBER WHO TESTED POSITIVE.

## 2024-01-29 NOTE — TELEPHONE ENCOUNTER
Caller: Arianna Leal    Relationship: Self    Best call back number: [phone]    What medication are you requesting: SOMETHING FOR LISTED SYMPTOMS     What are your current symptoms: SORE THROAT, CHEST CONGESTION, SHORTNESS OF AIR    How long have you been experiencing symptoms: 1 DAY    Have you had these symptoms before:    [] Yes  [x] No    Have you been treated for these symptoms before:   [] Yes  [x] No    If a prescription is needed, what is your preferred pharmacy and phone number:    [place], [place] - [address] [facility] - [phone] [facility] [phone]  [phone]          Additional notes:PATIENT STATES THAT HER SISTER ALREADY TESTED POSITIVE FOR FLU TYPE A

## 2025-08-26 PROBLEM — E66.01 CLASS 2 SEVERE OBESITY DUE TO EXCESS CALORIES WITH SERIOUS COMORBIDITY AND BODY MASS INDEX (BMI) OF 39.0 TO 39.9 IN ADULT: Status: ACTIVE | Noted: 2023-11-07

## 2025-08-26 PROBLEM — Z00.01 ENCOUNTER FOR GENERAL ADULT MEDICAL EXAMINATION WITH ABNORMAL FINDINGS: Status: ACTIVE | Noted: 2023-08-25

## 2025-08-26 PROBLEM — E66.01 CLASS 2 SEVERE OBESITY DUE TO EXCESS CALORIES WITH SERIOUS COMORBIDITY AND BODY MASS INDEX (BMI) OF 39.0 TO 39.9 IN ADULT: Chronic | Status: ACTIVE | Noted: 2023-11-07
